# Patient Record
Sex: FEMALE | Race: WHITE | NOT HISPANIC OR LATINO | Employment: UNEMPLOYED | URBAN - NONMETROPOLITAN AREA
[De-identification: names, ages, dates, MRNs, and addresses within clinical notes are randomized per-mention and may not be internally consistent; named-entity substitution may affect disease eponyms.]

---

## 2020-05-18 ENCOUNTER — OFFICE VISIT (OUTPATIENT)
Dept: CARDIOLOGY | Facility: CLINIC | Age: 70
End: 2020-05-18

## 2020-05-18 VITALS
HEIGHT: 64 IN | WEIGHT: 136.8 LBS | TEMPERATURE: 99.3 F | HEART RATE: 71 BPM | DIASTOLIC BLOOD PRESSURE: 76 MMHG | BODY MASS INDEX: 23.35 KG/M2 | SYSTOLIC BLOOD PRESSURE: 140 MMHG

## 2020-05-18 DIAGNOSIS — R00.2 PALPITATIONS: ICD-10-CM

## 2020-05-18 DIAGNOSIS — R07.89 CHEST PRESSURE: Primary | ICD-10-CM

## 2020-05-18 DIAGNOSIS — Z86.73 HISTORY OF TIA (TRANSIENT ISCHEMIC ATTACK): ICD-10-CM

## 2020-05-18 DIAGNOSIS — E55.9 VITAMIN D DEFICIENCY: ICD-10-CM

## 2020-05-18 DIAGNOSIS — R60.0 BILATERAL LEG EDEMA: ICD-10-CM

## 2020-05-18 DIAGNOSIS — R41.3 MEMORY LOSS: ICD-10-CM

## 2020-05-18 DIAGNOSIS — R06.02 SHORTNESS OF BREATH: ICD-10-CM

## 2020-05-18 PROCEDURE — 99204 OFFICE O/P NEW MOD 45 MIN: CPT | Performed by: INTERNAL MEDICINE

## 2020-05-18 PROCEDURE — 93000 ELECTROCARDIOGRAM COMPLETE: CPT | Performed by: INTERNAL MEDICINE

## 2020-05-18 RX ORDER — ASPIRIN 81 MG/1
81 TABLET ORAL DAILY
COMMUNITY

## 2020-05-18 RX ORDER — FEXOFENADINE HCL 180 MG/1
180 TABLET ORAL DAILY
COMMUNITY

## 2020-05-18 RX ORDER — PANTOPRAZOLE SODIUM 20 MG/1
20 TABLET, DELAYED RELEASE ORAL DAILY
COMMUNITY

## 2020-05-18 RX ORDER — GABAPENTIN 800 MG/1
800 TABLET ORAL 3 TIMES DAILY
COMMUNITY

## 2020-05-18 RX ORDER — CITALOPRAM 40 MG/1
40 TABLET ORAL NIGHTLY
COMMUNITY

## 2020-05-18 RX ORDER — LUBIPROSTONE 24 UG/1
24 CAPSULE ORAL 2 TIMES DAILY WITH MEALS
COMMUNITY

## 2020-05-18 RX ORDER — ESTRADIOL AND NORETHINDRONE ACETATE 1; .5 MG/1; MG/1
1 TABLET ORAL DAILY
COMMUNITY

## 2020-05-18 RX ORDER — HYDROCODONE BITARTRATE AND ACETAMINOPHEN 7.5; 325 MG/1; MG/1
1 TABLET ORAL EVERY 6 HOURS PRN
COMMUNITY

## 2020-05-18 RX ORDER — ALPRAZOLAM 0.5 MG/1
0.5 TABLET ORAL 3 TIMES DAILY
COMMUNITY

## 2020-05-18 RX ORDER — FLUTICASONE PROPIONATE 50 MCG
2 SPRAY, SUSPENSION (ML) NASAL DAILY
COMMUNITY

## 2020-05-18 NOTE — PROGRESS NOTES
Chief Complaint   Patient presents with   • Establish Care     Patient referred per PCP for chest pain. Has had no previous cardiac testing other than EKG.   • Chest Pain     Had pressure and tightness in chest that radiated to shoulders and arms with numbness in arms, lasting a few seconds. Has had occasional episodes of nauesa. She reports having panic attacks at times.   • Palpitations     Having occasional episodes, short in duration.   • Shortness of Breath     She relates to asthma and COPD.   • Irregular Heart Beat     At times feels like her heart skips beats, not often.   • Edema     Has occasional swelling in hands and ankles. Swelling in ankles relieved with elevation of legs.        CARDIAC COMPLAINTS  chest pressure/discomfort, dyspnea, lower extremity edema and palpitations      Subjective   Desiree Reynolds is a 69 y.o. female came in today with her daughter.  She has history of depression and possible bipolar disorder for which she is now being followed by her psychiatrist.  She also has history of questionable stroke diagnosed many years ago.  Details is not available to me.  She does not room by undergoing any kind of investigation including CT head or MRI.  She now started having symptoms of chest pressure in the form of tightness in the middle part of the chest which radiates to the shoulder as well as the arm.  She also developed numbness involving the arm.  The symptoms normally last for few seconds and subsides spontaneously.  It is not precipitated by any particular activities.  She does have some episodes of nausea which gets better.  She also has been having palpitation in the form of heart racing.  It normally lasts for few seconds and subsides.  She also has increasing shortness of breath which occurs mostly on exertion.  She think it is due to her asthma she.  She also has been having swelling involving her ankles which normally gets relieved by keeping the legs elevated or when she goes to  sleep.  She is not sure when was the last time she had any labs done.  She is not a smoker.  Her daughter has hypercholesterolemia and hypothyroidism.    No past surgical history on file.    Current Outpatient Medications   Medication Sig Dispense Refill   • ALPRAZolam (XANAX) 0.5 MG tablet Take 0.5 mg by mouth 3 (Three) Times a Day.     • aspirin 81 MG EC tablet Take 81 mg by mouth Daily.     • citalopram (CeleXA) 40 MG tablet Take 40 mg by mouth Every Night.     • DICLOFENAC PO Take 75 mg by mouth 2 (Two) Times a Day.     • estradiol-norethindrone (ACTIVELLA) 1-0.5 MG per tablet Take 1 tablet by mouth Daily.     • fexofenadine (ALLEGRA) 180 MG tablet Take 180 mg by mouth Daily.     • fluticasone (FLONASE) 50 MCG/ACT nasal spray 2 sprays into the nostril(s) as directed by provider Daily.     • gabapentin (NEURONTIN) 800 MG tablet Take 800 mg by mouth 3 (Three) Times a Day.     • HYDROcodone-acetaminophen (NORCO) 7.5-325 MG per tablet Take 1 tablet by mouth Every 6 (Six) Hours As Needed for Moderate Pain .     • IPRATROPIUM BROMIDE NA into the nostril(s) as directed by provider Daily.     • lubiprostone (AMITIZA) 24 MCG capsule Take 24 mcg by mouth 2 (Two) Times a Day With Meals.     • pantoprazole (PROTONIX) 20 MG EC tablet Take 20 mg by mouth Daily.       No current facility-administered medications for this visit.            ALLERGIES:  Codeine; Lamotrigine; Penicillins; Dexamethasone; and Prednisone    Past Medical History:   Diagnosis Date   • Anxiety    • Asthma    • Back pain    • Bipolar disorder (CMS/HCC)    • COPD (chronic obstructive pulmonary disease) (CMS/HCC)    • Hx of breast implant    • Hx of cholecystectomy    • Hx of spinal surgery     x3   • Hx of tubal ligation    • Hyperlipidemia    • Hypertension    • Stroke (CMS/[facility])        Social History     Tobacco Use   Smoking Status Never Smoker   Smokeless Tobacco Never Used          Family History   Problem Relation Age of Onset   • Cancer Mother    •  "Heart attack Father    • No Known Problems Sister    • No Known Problems Brother    • Hypothyroidism Daughter    • Hyperlipidemia Daughter    • Hypothyroidism Daughter        Review of Systems   Constitution: Positive for malaise/fatigue. Negative for decreased appetite.   HENT: Negative for congestion and sore throat.    Eyes: Negative for blurred vision.   Cardiovascular: Positive for chest pain, dyspnea on exertion and palpitations.   Respiratory: Positive for shortness of breath. Negative for snoring.    Endocrine: Negative for cold intolerance and heat intolerance.   Hematologic/Lymphatic: Negative for adenopathy. Does not bruise/bleed easily.   Skin: Negative for itching, nail changes and skin cancer.   Musculoskeletal: Negative for arthritis and myalgias.   Gastrointestinal: Negative for abdominal pain, dysphagia and heartburn.   Genitourinary: Negative for bladder incontinence and frequency.   Neurological: Negative for dizziness, light-headedness, seizures and vertigo.   Psychiatric/Behavioral: Positive for depression and memory loss. Negative for altered mental status. The patient is nervous/anxious.    Allergic/Immunologic: Negative for environmental allergies and hives.       Diabetes- No  Thyroid- normal    Objective     /76 (BP Location: Left arm)   Pulse 71   Temp 99.3 °F (37.4 °C)   Ht 162.6 cm (64\")   Wt 62.1 kg (136 lb 12.8 oz)   BMI 23.48 kg/m²     Physical Exam   Constitutional: She is oriented to person, place, and time. She appears well-developed and well-nourished.   HENT:   Head: Normocephalic.   Nose: Nose normal.   Eyes: Pupils are equal, round, and reactive to light. EOM are normal.   Neck: Normal range of motion. Neck supple.   Cardiovascular: Normal rate, regular rhythm, S1 normal and S2 normal.   Murmur heard.  Pulmonary/Chest: Effort normal and breath sounds normal.   Abdominal: Soft. Bowel sounds are normal.   Musculoskeletal: Normal range of motion. She exhibits edema. "   Neurological: She is alert and oriented to person, place, and time.   Skin: Skin is warm and dry.   Psychiatric: She has a normal mood and affect.         ECG 12 Lead  Date/Time: 5/18/2020 1:57 PM  Performed by: Stephanie Hawkins MD  Authorized by: Stephanie Hawkins MD   Comparison: compared with previous ECG from 7/23/2018  Similar to previous ECG  Rhythm: sinus rhythm  Rate: normal  QRS axis: normal    Clinical impression: normal ECG              Assessment/Plan   Patient's Body mass index is 23.48 kg/m². BMI is within normal parameters. No follow-up required..     Desiree was seen today for establish care, chest pain, palpitations, shortness of breath, irregular heart beat and edema.    Diagnoses and all orders for this visit:    Chest pressure  -     Stress Test With Myocardial Perfusion One Day; Future  -     Lipid Panel; Future  -     High Sensitivity CRP; Future    Palpitations  -     Stress Test With Myocardial Perfusion One Day; Future  -     TSH; Future  -     Magnesium; Future    Shortness of breath  -     Adult Transthoracic Echo Complete W/ Cont if Necessary Per Protocol; Future  -     CBC & Differential; Future    Bilateral leg edema  -     Comprehensive Metabolic Panel; Future    History of TIA (transient ischemic attack)    Memory loss  -     Vitamin B12; Future  -     Folate; Future    Vitamin D deficiency  -     Vitamin D 25 Hydroxy; Future       At baseline her heart rate is stable.  Her blood pressure is borderline elevated.  Her EKG shows normal sinus rhythm, normal AK interval, normal QTC.  Her clinical examination reveals a BMI of 24.  She does have normal heart sounds.  She does have short systolic murmur at the mitral area.  Her peripheral pulses appears to be normal and no pedal edema noted.    Regarding the chest pressure, it appears to be noncardiac but she does have some risk factors.  I advised her to undergo lab work to check a CRP level as well as the lipids.  I did schedule her  to undergo a stress test in the form of Lexiscan to rule out any ischemia causing the symptoms.    Regarding the palpitation, she is advised to have lab work to check her thyroid function tests as well as a magnesium level.  If there is any arrhythmia noted during the stress test then she might benefit with a low-dose of beta-blockers.    Regarding the shortness of breath, she needs to undergo an echocardiogram to evaluate the LV function, valvular structures as well as the PA pressure.  She is also advised to check CBC.    Regarding the leg edema, at this time I do not see any edema.  She is advised to have her electrolytes and renal function checked along with the albumin level.    Regarding her problems with her memory, she is advised to undergo lab work to check a B12, folic acid as well as a vitamin D level.    Based on the results, further recommendations will be made.             Electronically signed by Stephanie Hawkins MD May 18, 2020 13:48

## 2020-05-21 ENCOUNTER — HOSPITAL ENCOUNTER (OUTPATIENT)
Dept: CARDIOLOGY | Facility: HOSPITAL | Age: 70
Discharge: HOME | End: 2020-05-21

## 2020-05-21 ENCOUNTER — LAB (OUTPATIENT)
Dept: LAB | Facility: HOSPITAL | Age: 70
End: 2020-05-21

## 2020-05-21 DIAGNOSIS — R00.2 PALPITATIONS: ICD-10-CM

## 2020-05-21 DIAGNOSIS — E55.9 VITAMIN D DEFICIENCY: ICD-10-CM

## 2020-05-21 DIAGNOSIS — R07.89 CHEST PRESSURE: ICD-10-CM

## 2020-05-21 DIAGNOSIS — R06.02 SHORTNESS OF BREATH: ICD-10-CM

## 2020-05-21 DIAGNOSIS — R60.0 BILATERAL LEG EDEMA: ICD-10-CM

## 2020-05-21 DIAGNOSIS — R41.3 MEMORY LOSS: ICD-10-CM

## 2020-05-21 LAB
ALBUMIN SERPL-MCNC: 4.11 G/DL (ref 3.5–5.2)
ALBUMIN/GLOB SERPL: 1.4 G/DL
ALP SERPL-CCNC: 56 U/L (ref 39–117)
ALT SERPL W P-5'-P-CCNC: 15 U/L (ref 1–33)
ANION GAP SERPL CALCULATED.3IONS-SCNC: 14.3 MMOL/L (ref 5–15)
AST SERPL-CCNC: 20 U/L (ref 1–32)
BASOPHILS # BLD AUTO: 0.08 10*3/MM3 (ref 0–0.2)
BASOPHILS NFR BLD AUTO: 1.4 % (ref 0–1.5)
BILIRUB SERPL-MCNC: 0.5 MG/DL (ref 0.2–1.2)
BUN BLD-MCNC: 10 MG/DL (ref 8–23)
BUN/CREAT SERPL: 10.8 (ref 7–25)
CALCIUM SPEC-SCNC: 9.1 MG/DL (ref 8.6–10.5)
CHLORIDE SERPL-SCNC: 103 MMOL/L (ref 98–107)
CHOLEST SERPL-MCNC: 205 MG/DL (ref 0–200)
CO2 SERPL-SCNC: 22.7 MMOL/L (ref 22–29)
CREAT BLD-MCNC: 0.93 MG/DL (ref 0.57–1)
DEPRECATED RDW RBC AUTO: 45.3 FL (ref 37–54)
EOSINOPHIL # BLD AUTO: 0.16 10*3/MM3 (ref 0–0.4)
EOSINOPHIL NFR BLD AUTO: 2.8 % (ref 0.3–6.2)
ERYTHROCYTE [DISTWIDTH] IN BLOOD BY AUTOMATED COUNT: 13.2 % (ref 12.3–15.4)
GFR SERPL CREATININE-BSD FRML MDRD: 59 ML/MIN/1.73
GLOBULIN UR ELPH-MCNC: 2.9 GM/DL
GLUCOSE BLD-MCNC: 98 MG/DL (ref 65–99)
HCT VFR BLD AUTO: 37 % (ref 34–46.6)
HDLC SERPL-MCNC: 66 MG/DL (ref 40–60)
HGB BLD-MCNC: 11.7 G/DL (ref 12–15.9)
IMM GRANULOCYTES # BLD AUTO: 0.02 10*3/MM3 (ref 0–0.05)
IMM GRANULOCYTES NFR BLD AUTO: 0.3 % (ref 0–0.5)
LDLC SERPL CALC-MCNC: 126 MG/DL (ref 0–100)
LDLC/HDLC SERPL: 1.92 {RATIO}
LYMPHOCYTES # BLD AUTO: 2.71 10*3/MM3 (ref 0.7–3.1)
LYMPHOCYTES NFR BLD AUTO: 46.7 % (ref 19.6–45.3)
MAGNESIUM SERPL-MCNC: 1.9 MG/DL (ref 1.6–2.4)
MCH RBC QN AUTO: 29.1 PG (ref 26.6–33)
MCHC RBC AUTO-ENTMCNC: 31.6 G/DL (ref 31.5–35.7)
MCV RBC AUTO: 92 FL (ref 79–97)
MONOCYTES # BLD AUTO: 0.31 10*3/MM3 (ref 0.1–0.9)
MONOCYTES NFR BLD AUTO: 5.3 % (ref 5–12)
NEUTROPHILS # BLD AUTO: 2.52 10*3/MM3 (ref 1.7–7)
NEUTROPHILS NFR BLD AUTO: 43.5 % (ref 42.7–76)
NRBC BLD AUTO-RTO: 0 /100 WBC (ref 0–0.2)
PLATELET # BLD AUTO: 404 10*3/MM3 (ref 140–450)
PMV BLD AUTO: 10.9 FL (ref 6–12)
POTASSIUM BLD-SCNC: 4 MMOL/L (ref 3.5–5.2)
PROT SERPL-MCNC: 7 G/DL (ref 6–8.5)
RBC # BLD AUTO: 4.02 10*6/MM3 (ref 3.77–5.28)
SODIUM BLD-SCNC: 140 MMOL/L (ref 136–145)
TRIGL SERPL-MCNC: 63 MG/DL (ref 0–150)
TSH SERPL DL<=0.05 MIU/L-ACNC: 2.09 UIU/ML (ref 0.27–4.2)
VLDLC SERPL-MCNC: 12.6 MG/DL
WBC NRBC COR # BLD: 5.8 10*3/MM3 (ref 3.4–10.8)

## 2020-05-21 PROCEDURE — 0 TECHNETIUM SESTAMIBI: Performed by: INTERNAL MEDICINE

## 2020-05-21 PROCEDURE — 25010000002 REGADENOSON 0.4 MG/5ML SOLUTION: Performed by: INTERNAL MEDICINE

## 2020-05-21 PROCEDURE — 80053 COMPREHEN METABOLIC PANEL: CPT | Performed by: INTERNAL MEDICINE

## 2020-05-21 PROCEDURE — 36415 COLL VENOUS BLD VENIPUNCTURE: CPT

## 2020-05-21 PROCEDURE — 93016 CV STRESS TEST SUPVJ ONLY: CPT | Performed by: NURSE PRACTITIONER

## 2020-05-21 PROCEDURE — 93306 TTE W/DOPPLER COMPLETE: CPT | Performed by: INTERNAL MEDICINE

## 2020-05-21 PROCEDURE — 93018 CV STRESS TEST I&R ONLY: CPT | Performed by: INTERNAL MEDICINE

## 2020-05-21 PROCEDURE — 86141 C-REACTIVE PROTEIN HS: CPT | Performed by: INTERNAL MEDICINE

## 2020-05-21 PROCEDURE — 93306 TTE W/DOPPLER COMPLETE: CPT

## 2020-05-21 PROCEDURE — 93017 CV STRESS TEST TRACING ONLY: CPT

## 2020-05-21 PROCEDURE — 78452 HT MUSCLE IMAGE SPECT MULT: CPT

## 2020-05-21 PROCEDURE — 80061 LIPID PANEL: CPT | Performed by: INTERNAL MEDICINE

## 2020-05-21 PROCEDURE — 85025 COMPLETE CBC W/AUTO DIFF WBC: CPT | Performed by: INTERNAL MEDICINE

## 2020-05-21 PROCEDURE — 82607 VITAMIN B-12: CPT | Performed by: INTERNAL MEDICINE

## 2020-05-21 PROCEDURE — 82306 VITAMIN D 25 HYDROXY: CPT | Performed by: INTERNAL MEDICINE

## 2020-05-21 PROCEDURE — 82746 ASSAY OF FOLIC ACID SERUM: CPT | Performed by: INTERNAL MEDICINE

## 2020-05-21 PROCEDURE — A9500 TC99M SESTAMIBI: HCPCS | Performed by: INTERNAL MEDICINE

## 2020-05-21 PROCEDURE — 84443 ASSAY THYROID STIM HORMONE: CPT | Performed by: INTERNAL MEDICINE

## 2020-05-21 PROCEDURE — 83735 ASSAY OF MAGNESIUM: CPT | Performed by: INTERNAL MEDICINE

## 2020-05-21 PROCEDURE — 78452 HT MUSCLE IMAGE SPECT MULT: CPT | Performed by: INTERNAL MEDICINE

## 2020-05-21 RX ADMIN — TECHNETIUM TC 99M SESTAMIBI 1 DOSE: 1 INJECTION INTRAVENOUS at 08:20

## 2020-05-21 RX ADMIN — TECHNETIUM TC 99M SESTAMIBI 1 DOSE: 1 INJECTION INTRAVENOUS at 08:39

## 2020-05-21 RX ADMIN — REGADENOSON 0.4 MG: 0.08 INJECTION, SOLUTION INTRAVENOUS at 08:39

## 2020-05-22 ENCOUNTER — TELEPHONE (OUTPATIENT)
Dept: CARDIOLOGY | Facility: CLINIC | Age: 70
End: 2020-05-22

## 2020-05-22 LAB
25(OH)D3 SERPL-MCNC: 41.4 NG/ML (ref 30–100)
BH CV ECHO MEAS - ACS: 1.8 CM
BH CV ECHO MEAS - AO MAX PG: 6.3 MMHG
BH CV ECHO MEAS - AO MEAN PG: 4 MMHG
BH CV ECHO MEAS - AO ROOT AREA (BSA CORRECTED): 1.6
BH CV ECHO MEAS - AO ROOT AREA: 5.3 CM^2
BH CV ECHO MEAS - AO ROOT DIAM: 2.6 CM
BH CV ECHO MEAS - AO V2 MAX: 125 CM/SEC
BH CV ECHO MEAS - AO V2 MEAN: 91.2 CM/SEC
BH CV ECHO MEAS - AO V2 VTI: 32.4 CM
BH CV ECHO MEAS - BSA(HAYCOCK): 1.7 M^2
BH CV ECHO MEAS - BSA: 1.7 M^2
BH CV ECHO MEAS - BZI_BMI: 23.3 KILOGRAMS/M^2
BH CV ECHO MEAS - BZI_METRIC_HEIGHT: 162.6 CM
BH CV ECHO MEAS - BZI_METRIC_WEIGHT: 61.7 KG
BH CV ECHO MEAS - EDV(CUBED): 87.5 ML
BH CV ECHO MEAS - EDV(MOD-SP4): 64.1 ML
BH CV ECHO MEAS - EDV(TEICH): 89.6 ML
BH CV ECHO MEAS - EF(CUBED): 67.9 %
BH CV ECHO MEAS - EF(MOD-SP4): 62.9 %
BH CV ECHO MEAS - EF(TEICH): 59.6 %
BH CV ECHO MEAS - ESV(CUBED): 28.1 ML
BH CV ECHO MEAS - ESV(MOD-SP4): 23.8 ML
BH CV ECHO MEAS - ESV(TEICH): 36.2 ML
BH CV ECHO MEAS - FS: 31.5 %
BH CV ECHO MEAS - IVS/LVPW: 0.81
BH CV ECHO MEAS - IVSD: 0.81 CM
BH CV ECHO MEAS - LA DIMENSION: 3.2 CM
BH CV ECHO MEAS - LA/AO: 1.2
BH CV ECHO MEAS - LV DIASTOLIC VOL/BSA (35-75): 38.6 ML/M^2
BH CV ECHO MEAS - LV IVRT: 0.09 SEC
BH CV ECHO MEAS - LV MASS(C)D: 131 GRAMS
BH CV ECHO MEAS - LV MASS(C)DI: 78.9 GRAMS/M^2
BH CV ECHO MEAS - LV SYSTOLIC VOL/BSA (12-30): 14.3 ML/M^2
BH CV ECHO MEAS - LVIDD: 4.4 CM
BH CV ECHO MEAS - LVIDS: 3 CM
BH CV ECHO MEAS - LVLD AP4: 6.5 CM
BH CV ECHO MEAS - LVLS AP4: 6 CM
BH CV ECHO MEAS - LVOT AREA (M): 2.8 CM^2
BH CV ECHO MEAS - LVOT AREA: 2.8 CM^2
BH CV ECHO MEAS - LVOT DIAM: 1.9 CM
BH CV ECHO MEAS - LVPWD: 1 CM
BH CV ECHO MEAS - MV A MAX VEL: 72.2 CM/SEC
BH CV ECHO MEAS - MV DEC SLOPE: 497 CM/SEC^2
BH CV ECHO MEAS - MV E MAX VEL: 105 CM/SEC
BH CV ECHO MEAS - MV E/A: 1.5
BH CV ECHO MEAS - RVDD: 2.3 CM
BH CV ECHO MEAS - SI(AO): 103.6 ML/M^2
BH CV ECHO MEAS - SI(CUBED): 35.8 ML/M^2
BH CV ECHO MEAS - SI(MOD-SP4): 24.3 ML/M^2
BH CV ECHO MEAS - SI(TEICH): 32.2 ML/M^2
BH CV ECHO MEAS - SV(AO): 172 ML
BH CV ECHO MEAS - SV(CUBED): 59.4 ML
BH CV ECHO MEAS - SV(MOD-SP4): 40.3 ML
BH CV ECHO MEAS - SV(TEICH): 53.4 ML
BH CV STRESS COMMENTS STAGE 1: NORMAL
BH CV STRESS DOSE REGADENOSON STAGE 1: 0.4
BH CV STRESS DURATION MIN STAGE 1: 0
BH CV STRESS DURATION SEC STAGE 1: 10
BH CV STRESS PROTOCOL 1: NORMAL
BH CV STRESS RECOVERY BP: NORMAL MMHG
BH CV STRESS RECOVERY HR: 73 BPM
BH CV STRESS STAGE 1: 1
CRP SERPL-MCNC: 0.12 MG/DL (ref 0.01–0.5)
FOLATE SERPL-MCNC: >20 NG/ML (ref 4.78–24.2)
LV EF NUC BP: 67 %
MAXIMAL PREDICTED HEART RATE: 149 BPM
MAXIMAL PREDICTED HEART RATE: 149 BPM
PERCENT MAX PREDICTED HR: 54.36 %
STRESS BASELINE BP: NORMAL MMHG
STRESS BASELINE HR: 60 BPM
STRESS PERCENT HR: 64 %
STRESS POST PEAK BP: NORMAL MMHG
STRESS POST PEAK HR: 81 BPM
STRESS TARGET HR: 127 BPM
STRESS TARGET HR: 127 BPM
VIT B12 BLD-MCNC: 720 PG/ML (ref 211–946)

## 2020-05-22 RX ORDER — ATORVASTATIN CALCIUM 20 MG/1
20 TABLET, FILM COATED ORAL DAILY
Qty: 30 TABLET | Refills: 11 | Status: SHIPPED | OUTPATIENT
Start: 2020-05-22 | End: 2020-10-29 | Stop reason: SDDI

## 2020-05-22 RX ORDER — ISOSORBIDE MONONITRATE 30 MG/1
30 TABLET, EXTENDED RELEASE ORAL DAILY
Qty: 30 TABLET | Refills: 11 | Status: SHIPPED | OUTPATIENT
Start: 2020-05-22 | End: 2021-04-28 | Stop reason: SDUPTHER

## 2020-05-22 NOTE — TELEPHONE ENCOUNTER
----- Message from Stephanie Hawkins MD sent at 5/22/2020  7:16 AM EDT -----  Imdur.  Cath if she has more CP

## 2020-05-22 NOTE — TELEPHONE ENCOUNTER
Patient aware of stress test results and recommendations to add Imdur 30 mg daily and if CP persists to call the office.  Patient also aware of lab results and recommendations to start Lipitor 20 mg daily.

## 2020-10-29 ENCOUNTER — OFFICE VISIT (OUTPATIENT)
Dept: CARDIOLOGY | Facility: CLINIC | Age: 70
End: 2020-10-29

## 2020-10-29 VITALS
BODY MASS INDEX: 23.08 KG/M2 | HEIGHT: 64 IN | SYSTOLIC BLOOD PRESSURE: 142 MMHG | TEMPERATURE: 98.2 F | DIASTOLIC BLOOD PRESSURE: 80 MMHG | WEIGHT: 135.2 LBS | HEART RATE: 64 BPM

## 2020-10-29 DIAGNOSIS — R42 DIZZINESS: ICD-10-CM

## 2020-10-29 DIAGNOSIS — R94.39 ABNORMAL NUCLEAR STRESS TEST: ICD-10-CM

## 2020-10-29 DIAGNOSIS — E78.00 HYPERCHOLESTEREMIA: ICD-10-CM

## 2020-10-29 DIAGNOSIS — R41.3 MEMORY LOSS: ICD-10-CM

## 2020-10-29 DIAGNOSIS — Z86.73 HISTORY OF TIA (TRANSIENT ISCHEMIC ATTACK): Primary | ICD-10-CM

## 2020-10-29 PROCEDURE — 99214 OFFICE O/P EST MOD 30 MIN: CPT | Performed by: NURSE PRACTITIONER

## 2020-10-29 RX ORDER — PRENATAL VIT NO.126/IRON/FOLIC 28MG-0.8MG
TABLET ORAL DAILY
COMMUNITY

## 2020-10-29 RX ORDER — MULTIVIT-MIN/IRON/FOLIC ACID/K 18-600-40
CAPSULE ORAL DAILY
COMMUNITY

## 2020-10-29 NOTE — PROGRESS NOTES
Chief Complaint   Patient presents with   • Follow-up     Cardiac management.   • Lab     Last labs in chart. Does not need refills at this time.   • Dizziness     Having more dizziness since taking Imdur.   • Palpitations     Only with anxiety.   • Atorvastatin     She did not take due to causing problems with family members and she was afraid would cause her problems.     Saumya Reynolds is a 70 y.o. female with no significant past cardiac history who was referred for evaluation of chest pressure in May 2020. She has history of depression and Bipolar disorder followed by psychiatry. She had questionable stroke in the past, not confirmed, no details available. She developed symptoms of chest pressure radiating to her shoulders and arms bilaterally. Her arms and hands felt numb and tingly. Symptoms seemed to occur on Sunday mornings, Lasted a few seconds to minutes then subsided spontaneously. She admitted to mild SOB she felt related to history of asthma. She underwent cardiac work up with Lexiscan stress showing possible anterior wall changes felt to be breast attenuation but could not rule out ischemia. LV function was normal. Imdur was added with plan for cath for persistent symptoms. Lipitor recommended for .     She returns today for follow up. Chest pressure has resolved since starting Imdur. She does have mild dizziness without falls, headache, or syncope. She has declined statin therapy, did not start atorvastatin as she was fearful of possible side effects. She does admit to not being as active as she would like. She does feel another wave of depression coming on.        Cardiac History:    Past Surgical History:   Procedure Laterality Date   • CARDIOVASCULAR STRESS TEST  05/21/2020    L. Cardiolite- EF 67%. R/O Anterior Ischemia.   • ECHO - CONVERTED  05/21/2020    EF 55%. Mild MR .     Current Outpatient Medications   Medication Sig Dispense Refill   • ALPRAZolam (XANAX) 0.5 MG  tablet Take 0.5 mg by mouth 3 (Three) Times a Day.     • aspirin 81 MG EC tablet Take 81 mg by mouth Daily.     • citalopram (CeleXA) 40 MG tablet Take 40 mg by mouth Every Night.     • DICLOFENAC PO Take 75 mg by mouth 2 (Two) Times a Day.     • estradiol-norethindrone (ACTIVELLA) 1-0.5 MG per tablet Take 1 tablet by mouth Daily.     • fexofenadine (ALLEGRA) 180 MG tablet Take 180 mg by mouth Daily.     • fluticasone (FLONASE) 50 MCG/ACT nasal spray 2 sprays into the nostril(s) as directed by provider Daily.     • gabapentin (NEURONTIN) 800 MG tablet Take 800 mg by mouth 3 (Three) Times a Day.     • HYDROcodone-acetaminophen (NORCO) 7.5-325 MG per tablet Take 1 tablet by mouth Every 6 (Six) Hours As Needed for Moderate Pain .     • IPRATROPIUM BROMIDE NA into the nostril(s) as directed by provider Daily.     • isosorbide mononitrate (IMDUR) 30 MG 24 hr tablet Take 1 tablet by mouth Daily. 30 tablet 11   • linaclotide (LINZESS) 290 MCG capsule capsule Take 290 mcg by mouth Every Morning Before Breakfast.     • lubiprostone (AMITIZA) 24 MCG capsule Take 24 mcg by mouth 2 (Two) Times a Day With Meals.     • Multiple Vitamins-Minerals (Hair/Skin/Nails) tablet Take  by mouth Daily.     • pantoprazole (PROTONIX) 20 MG EC tablet Take 20 mg by mouth Daily.     • prenatal vitamin (prenatal, CLASSIC, vitamin) tablet Take  by mouth Daily.     • Vitamin D, Cholecalciferol, 50 MCG (2000 UT) capsule Take  by mouth Daily.       No current facility-administered medications for this visit.      Codeine, Lamotrigine, Penicillins, Dexamethasone, and Prednisone    Past Medical History:   Diagnosis Date   • Anxiety    • Asthma    • Back pain    • Bipolar disorder (CMS/HCC)    • COPD (chronic obstructive pulmonary disease) (CMS/HCC)    • Hx of breast implant    • Hx of cholecystectomy    • Hx of spinal surgery     x3   • Hx of tubal ligation    • Hyperlipidemia    • Hypertension    • Stroke (CMS/HCC)      Social History      Socioeconomic History   • Marital status:      Spouse name: Not on file   • Number of children: Not on file   • Years of education: Not on file   • Highest education level: Not on file   Tobacco Use   • Smoking status: Never Smoker   • Smokeless tobacco: Never Used   Substance and Sexual Activity   • Alcohol use: Never     Frequency: Never   • Drug use: Never     Family History   Problem Relation Age of Onset   • Cancer Mother    • Heart attack Father    • No Known Problems Sister    • No Known Problems Brother    • Hypothyroidism Daughter    • Hyperlipidemia Daughter    • Hypothyroidism Daughter      Review of Systems   Constitution: Negative for decreased appetite and malaise/fatigue.   HENT: Negative.    Eyes: Negative for blurred vision.   Cardiovascular: Negative for chest pain, dyspnea on exertion, leg swelling, palpitations and syncope.   Respiratory: Negative for shortness of breath and sleep disturbances due to breathing.    Endocrine: Negative.    Hematologic/Lymphatic: Negative for bleeding problem. Does not bruise/bleed easily.   Skin: Negative.    Musculoskeletal: Negative for falls and myalgias.   Gastrointestinal: Negative for abdominal pain, heartburn and melena.   Genitourinary: Negative for hematuria.   Neurological: Positive for dizziness (mild). Negative for light-headedness.   Psychiatric/Behavioral: Positive for depression. Negative for altered mental status. The patient is nervous/anxious.    Allergic/Immunologic: Negative.       Objective      5/21/20  Total Cholesterol   0 - 200 mg/dL 205High     Triglycerides   0 - 150 mg/dL 63    HDL Cholesterol   40 - 60 mg/dL 66High     LDL Cholesterol    0 - 100 mg/dL 126High     VLDL Cholesterol   mg/dL 12.6    LDL/HDL Ratio  1.92      TSH   0.270 - 4.200 uIU/mL 2.090      Magnesium   1.6 - 2.4 mg/dL 1.9      Glucose   65 - 99 mg/dL 98    BUN   8 - 23 mg/dL 10    Creatinine   0.57 - 1.00 mg/dL 0.93    Sodium   136 - 145 mmol/L 140   "  Potassium   3.5 - 5.2 mmol/L 4.0    Chloride   98 - 107 mmol/L 103    CO2   22.0 - 29.0 mmol/L 22.7    Calcium   8.6 - 10.5 mg/dL 9.1    Total Protein   6.0 - 8.5 g/dL 7.0    Albumin   3.50 - 5.20 g/dL 4.11    ALT (SGPT)   1 - 33 U/L 15    AST (SGOT)   1 - 32 U/L 20    Alkaline Phosphatase   39 - 117 U/L 56    Total Bilirubin   0.2 - 1.2 mg/dL 0.5    eGFR Non African Amer   >60 mL/min/1.73 59Low     Globulin   gm/dL 2.9    A/G Ratio   g/dL 1.4    BUN/Creatinine Ratio   7.0 - 25.0 10.8    Anion Gap   5.0 - 15.0 mmol/L 14.3      C-Reactive Protein   0.010 - 0.500 mg/dL 0.122      Vitamin B-12   211 - 946 pg/mL 720      25 Hydroxy, Vitamin D   30.0 - 100.0 ng/ml 41.4       /80 (BP Location: Left arm)   Pulse 64   Temp 98.2 °F (36.8 °C)   Ht 162.6 cm (64.02\")   Wt 61.3 kg (135 lb 3.2 oz)   BMI 23.20 kg/m²     Vitals signs and nursing note reviewed.   Constitutional:       General: Not in acute distress.     Appearance: Well-developed. Not diaphoretic.   Eyes:      Pupils: Pupils are equal, round, and reactive to light.   HENT:      Head: Normocephalic.   Neck:      Musculoskeletal: Normal range of motion.      Vascular: Carotid bruit present.   Pulmonary:      Effort: Pulmonary effort is normal. No respiratory distress.      Breath sounds: Normal breath sounds.   Cardiovascular:      Normal rate. Regular rhythm.      Murmurs: There is a grade 1/6 systolic murmur at the apex.   Pulses:     Intact distal pulses.   Edema:     Peripheral edema absent.   Abdominal:      General: Bowel sounds are normal.      Palpations: Abdomen is soft.   Musculoskeletal: Normal range of motion.   Skin:     General: Skin is warm and dry.   Neurological:      Mental Status: Alert and oriented to person, place, and time.        Procedures          Problem List Items Addressed This Visit        Cardiovascular and Mediastinum    Abnormal nuclear stress test    Hypercholesteremia       Other    Memory loss    Relevant Orders    US " Carotid Bilateral    History of TIA (transient ischemic attack) - Primary    Relevant Orders    US Carotid Bilateral      Other Visit Diagnoses     Dizziness        Relevant Orders    US Carotid Bilateral         1. Abnormal stress- reviewed the findings of her stress in detail showing possible anterior wall changes. She has found benefit with long-acting nitrates, so continue the same. Continue low dose aspirin. She has declined statin.  Spoke with her daughter by phone during visit and explained the test findings.     2. Hyperlipidemia- patient prefers to be managed conservatively. Low cholesterol, low sugar diet recommended.     3. Dizziness- may be related to nitrate. She does have possible bruit to right carotid. With history of TIA, will go ahead and get a carotid US to rule out stenosis. This will be scheduled at [facility].     Further recommendations to follow. We will see her back in six months or sooner as needed.     Patient's Body mass index is 23.2 kg/m². BMI is within normal parameters. No follow-up required..              Electronically signed by NICOL Prieto,  October 30, 2020 14:08 EDT

## 2020-10-30 PROBLEM — E78.00 HYPERCHOLESTEREMIA: Status: ACTIVE | Noted: 2020-10-30

## 2020-10-30 PROBLEM — R94.39 ABNORMAL NUCLEAR STRESS TEST: Status: ACTIVE | Noted: 2020-10-30

## 2021-04-28 ENCOUNTER — OFFICE VISIT (OUTPATIENT)
Dept: CARDIOLOGY | Facility: CLINIC | Age: 71
End: 2021-04-28

## 2021-04-28 VITALS
TEMPERATURE: 98.2 F | WEIGHT: 133.2 LBS | SYSTOLIC BLOOD PRESSURE: 112 MMHG | DIASTOLIC BLOOD PRESSURE: 68 MMHG | BODY MASS INDEX: 22.74 KG/M2 | HEART RATE: 68 BPM | HEIGHT: 64 IN

## 2021-04-28 DIAGNOSIS — R94.39 ABNORMAL STRESS TEST: ICD-10-CM

## 2021-04-28 DIAGNOSIS — E78.00 HYPERCHOLESTEREMIA: ICD-10-CM

## 2021-04-28 DIAGNOSIS — R06.02 SHORTNESS OF BREATH: ICD-10-CM

## 2021-04-28 DIAGNOSIS — R00.2 PALPITATIONS: ICD-10-CM

## 2021-04-28 DIAGNOSIS — R42 DIZZINESS: ICD-10-CM

## 2021-04-28 DIAGNOSIS — Z86.73 HISTORY OF TIA (TRANSIENT ISCHEMIC ATTACK): Primary | ICD-10-CM

## 2021-04-28 PROCEDURE — 99213 OFFICE O/P EST LOW 20 MIN: CPT | Performed by: NURSE PRACTITIONER

## 2021-04-28 RX ORDER — ISOSORBIDE MONONITRATE 30 MG/1
30 TABLET, EXTENDED RELEASE ORAL DAILY
Qty: 90 TABLET | Refills: 3 | Status: SHIPPED | OUTPATIENT
Start: 2021-04-28

## 2021-04-28 NOTE — PROGRESS NOTES
Chief Complaint   Patient presents with   • Follow-up     Cardiac management   • Lab     Last labs in chart.   • Tingling     Having sensation in right arm from elbow to hand. Having pain in lower back and legs.   • Shortness of Breath     Same as before, relates to COPD and allergies.   • Med Refill     Needs refills on Imdur-90 day.     Saumya Reynolds is a 70 y.o. female with no significant past cardiac history who was referred for evaluation of chest pressure in May 2020. She has history of depression and Bipolar disorder followed by psychiatry. She had questionable stroke in the past, not confirmed, details unavailable.  For symptoms of chest pressure radiating to shoulders and arms bilaterally, she underwent cardiac work-up with Lexiscan stress showing possible anterior wall changes felt to be breast attenuation but could not rule out ischemia. LV function was normal. Imdur was added with plan for cath for persistent symptoms. Lipitor recommended for .  She was hesitant to start statin and preferred diet.  Chest pressure resolved with Imdur.  Carotid ultrasound ordered last visit showed normal left, mild to moderate right (50%).    She returns today for regular follow-up.  Chest pain remains improved.  She does admit to mild shortness of breath, dyspnea exertion she relates to allergies and asthma.  She has an occasional headache but does not relate to Imdur therapy as she is always had headaches.          Cardiac History:    Past Surgical History:   Procedure Laterality Date   • CARDIOVASCULAR STRESS TEST  05/21/2020    L. Cardiolite- EF 67%. R/O Anterior Ischemia.   • ECHO - CONVERTED  05/21/2020    EF 55%. Mild MR .   • US CAROTID UNILATERAL  11/04/2020    Normal LICA, mild to moderate MCKENNA (50%)       Current Outpatient Medications   Medication Sig Dispense Refill   • ALPRAZolam (XANAX) 0.5 MG tablet Take 0.5 mg by mouth 3 (Three) Times a Day.     • aspirin 81 MG EC tablet Take 81 mg  by mouth Daily.     • citalopram (CeleXA) 40 MG tablet Take 40 mg by mouth Every Night.     • DICLOFENAC PO Take 75 mg by mouth 2 (Two) Times a Day.     • estradiol-norethindrone (ACTIVELLA) 1-0.5 MG per tablet Take 1 tablet by mouth Daily.     • fexofenadine (ALLEGRA) 180 MG tablet Take 180 mg by mouth Daily.     • fluticasone (FLONASE) 50 MCG/ACT nasal spray 2 sprays into the nostril(s) as directed by provider Daily.     • gabapentin (NEURONTIN) 800 MG tablet Take 800 mg by mouth 3 (Three) Times a Day.     • HYDROcodone-acetaminophen (NORCO) 7.5-325 MG per tablet Take 1 tablet by mouth Every 6 (Six) Hours As Needed for Moderate Pain .     • IPRATROPIUM BROMIDE NA into the nostril(s) as directed by provider Daily.     • isosorbide mononitrate (IMDUR) 30 MG 24 hr tablet Take 1 tablet by mouth Daily. 90 tablet 3   • linaclotide (LINZESS) 290 MCG capsule capsule Take 290 mcg by mouth Every Morning Before Breakfast.     • lubiprostone (AMITIZA) 24 MCG capsule Take 24 mcg by mouth 2 (Two) Times a Day With Meals.     • Multiple Vitamins-Minerals (Hair/Skin/Nails) tablet Take  by mouth Daily.     • pantoprazole (PROTONIX) 20 MG EC tablet Take 20 mg by mouth Daily.     • prenatal vitamin (prenatal, CLASSIC, vitamin) tablet Take  by mouth Daily.     • Vitamin D, Cholecalciferol, 50 MCG (2000 UT) capsule Take  by mouth Daily.       No current facility-administered medications for this visit.       Codeine, Lamotrigine, Penicillins, Dexamethasone, and Prednisone    Past Medical History:   Diagnosis Date   • Anxiety    • Asthma    • Back pain    • Bipolar disorder (CMS/HCC)    • COPD (chronic obstructive pulmonary disease) (CMS/HCC)    • Diverticulitis    • Hx of breast implant    • Hx of cholecystectomy    • Hx of spinal surgery     x3   • Hx of tubal ligation    • Hyperlipidemia    • Hypertension    • Sciatic pain    • Stroke (CMS/HCC)        Social History     Socioeconomic History   • Marital status:      Spouse  "name: Not on file   • Number of children: Not on file   • Years of education: Not on file   • Highest education level: Not on file   Tobacco Use   • Smoking status: Never Smoker   • Smokeless tobacco: Never Used   Vaping Use   • Vaping Use: Never used   Substance and Sexual Activity   • Alcohol use: Never   • Drug use: Never       Family History   Problem Relation Age of Onset   • Cancer Mother    • Heart attack Father    • No Known Problems Sister    • No Known Problems Brother    • Hypothyroidism Daughter    • Hyperlipidemia Daughter    • Hypothyroidism Daughter        ROS     Objective     /68 (BP Location: Right arm)   Pulse 68   Temp 98.2 °F (36.8 °C)   Ht 162.6 cm (64.02\")   Wt 60.4 kg (133 lb 3.2 oz)   BMI 22.85 kg/m²     Physical Exam     Procedures          Problem List Items Addressed This Visit        Cardiac and Vasculature    Palpitations    Relevant Orders    TSH    Hypercholesteremia    Relevant Orders    CBC (No Diff)    Comprehensive Metabolic Panel    Lipid Panel       Neuro    History of TIA (transient ischemic attack) - Primary    Relevant Orders    CBC (No Diff)    Comprehensive Metabolic Panel       Pulmonary and Pneumonias    Shortness of breath    Relevant Orders    CBC (No Diff)    Comprehensive Metabolic Panel      Other Visit Diagnoses     Dizziness        Relevant Orders    CBC (No Diff)    Comprehensive Metabolic Panel    TSH    Abnormal stress test        Relevant Medications    isosorbide mononitrate (IMDUR) 30 MG 24 hr tablet         1. Abnormal stress- reviewed stress report with her showing questionable anterior wall changes.  Symptoms have improved significantly with isosorbide.  Continue the same. Continue low dose aspirin. She has declined statin.       2. Hyperlipidemia- patient prefers to be managed conservatively. Low cholesterol, low sugar diet recommended.  Labs ordered given to recheck her lipids.     3. Dizziness/headache-not a new symptom, we reviewed carotid " ultrasound.  Continue to observe.    Patient's Body mass index is 22.85 kg/m². BMI is within normal parameters. No follow-up required..               Electronically signed by NICOL Prieto,  April 29, 2021 10:55 EDT